# Patient Record
Sex: MALE | Race: WHITE
[De-identification: names, ages, dates, MRNs, and addresses within clinical notes are randomized per-mention and may not be internally consistent; named-entity substitution may affect disease eponyms.]

---

## 2018-11-04 ENCOUNTER — HOSPITAL ENCOUNTER (EMERGENCY)
Dept: HOSPITAL 62 - ER | Age: 58
LOS: 1 days | Discharge: HOME | End: 2018-11-05
Payer: SELF-PAY

## 2018-11-04 DIAGNOSIS — Z88.0: ICD-10-CM

## 2018-11-04 DIAGNOSIS — M54.5: ICD-10-CM

## 2018-11-04 DIAGNOSIS — R03.0: ICD-10-CM

## 2018-11-04 DIAGNOSIS — I25.10: ICD-10-CM

## 2018-11-04 DIAGNOSIS — R10.9: Primary | ICD-10-CM

## 2018-11-04 DIAGNOSIS — E78.2: ICD-10-CM

## 2018-11-04 DIAGNOSIS — E03.9: ICD-10-CM

## 2018-11-04 DIAGNOSIS — E78.00: ICD-10-CM

## 2018-11-04 LAB
ADD MANUAL DIFF: NO
ALBUMIN SERPL-MCNC: 4.3 G/DL (ref 3.5–5)
ALP SERPL-CCNC: 65 U/L (ref 38–126)
ALT SERPL-CCNC: 31 U/L (ref 21–72)
ANION GAP SERPL CALC-SCNC: 12 MMOL/L (ref 5–19)
AST SERPL-CCNC: 24 U/L (ref 17–59)
BASOPHILS # BLD AUTO: 0.1 10^3/UL (ref 0–0.2)
BASOPHILS NFR BLD AUTO: 0.9 % (ref 0–2)
BILIRUB DIRECT SERPL-MCNC: 0.2 MG/DL (ref 0–0.4)
BILIRUB SERPL-MCNC: 0.5 MG/DL (ref 0.2–1.3)
BUN SERPL-MCNC: 23 MG/DL (ref 7–20)
CALCIUM: 9.7 MG/DL (ref 8.4–10.2)
CHLORIDE SERPL-SCNC: 105 MMOL/L (ref 98–107)
CO2 SERPL-SCNC: 26 MMOL/L (ref 22–30)
EOSINOPHIL # BLD AUTO: 0.4 10^3/UL (ref 0–0.6)
EOSINOPHIL NFR BLD AUTO: 3.2 % (ref 0–6)
ERYTHROCYTE [DISTWIDTH] IN BLOOD BY AUTOMATED COUNT: 14 % (ref 11.5–14)
GLUCOSE SERPL-MCNC: 124 MG/DL (ref 75–110)
HCT VFR BLD CALC: 39.8 % (ref 37.9–51)
HGB BLD-MCNC: 13.6 G/DL (ref 13.5–17)
LYMPHOCYTES # BLD AUTO: 2.3 10^3/UL (ref 0.5–4.7)
LYMPHOCYTES NFR BLD AUTO: 18.7 % (ref 13–45)
MCH RBC QN AUTO: 31 PG (ref 27–33.4)
MCHC RBC AUTO-ENTMCNC: 34.1 G/DL (ref 32–36)
MCV RBC AUTO: 91 FL (ref 80–97)
MONOCYTES # BLD AUTO: 0.9 10^3/UL (ref 0.1–1.4)
MONOCYTES NFR BLD AUTO: 6.9 % (ref 3–13)
NEUTROPHILS # BLD AUTO: 8.8 10^3/UL (ref 1.7–8.2)
NEUTS SEG NFR BLD AUTO: 70.3 % (ref 42–78)
PLATELET # BLD: 245 10^3/UL (ref 150–450)
POTASSIUM SERPL-SCNC: 4.6 MMOL/L (ref 3.6–5)
PROT SERPL-MCNC: 8 G/DL (ref 6.3–8.2)
RBC # BLD AUTO: 4.38 10^6/UL (ref 4.35–5.55)
SODIUM SERPL-SCNC: 143.3 MMOL/L (ref 137–145)
TOTAL CELLS COUNTED % (AUTO): 100 %
WBC # BLD AUTO: 12.5 10^3/UL (ref 4–10.5)

## 2018-11-04 PROCEDURE — 93010 ELECTROCARDIOGRAM REPORT: CPT

## 2018-11-04 PROCEDURE — 96375 TX/PRO/DX INJ NEW DRUG ADDON: CPT

## 2018-11-04 PROCEDURE — 80053 COMPREHEN METABOLIC PANEL: CPT

## 2018-11-04 PROCEDURE — 71046 X-RAY EXAM CHEST 2 VIEWS: CPT

## 2018-11-04 PROCEDURE — 96376 TX/PRO/DX INJ SAME DRUG ADON: CPT

## 2018-11-04 PROCEDURE — 85025 COMPLETE CBC W/AUTO DIFF WBC: CPT

## 2018-11-04 PROCEDURE — 93005 ELECTROCARDIOGRAM TRACING: CPT

## 2018-11-04 PROCEDURE — 74176 CT ABD & PELVIS W/O CONTRAST: CPT

## 2018-11-04 PROCEDURE — 96374 THER/PROPH/DIAG INJ IV PUSH: CPT

## 2018-11-04 PROCEDURE — 36415 COLL VENOUS BLD VENIPUNCTURE: CPT

## 2018-11-04 PROCEDURE — 96361 HYDRATE IV INFUSION ADD-ON: CPT

## 2018-11-04 PROCEDURE — 99285 EMERGENCY DEPT VISIT HI MDM: CPT

## 2018-11-04 PROCEDURE — 81001 URINALYSIS AUTO W/SCOPE: CPT

## 2018-11-04 NOTE — ER DOCUMENT REPORT
ED General





- General


Chief Complaint: Flank Pain


Stated Complaint: FLANK PAIN


Time Seen by Provider: 11/04/18 22:06


Mode of Arrival: Ambulatory


Information source: Patient, Relative, Critical access hospital Records


Notes: 





58-year-old male with hyperlipidemia, coronary artery disease, hypothyroidism, 

hypertension (not currently on medication) presents with complaints of left 

flank pain that started a few hours prior to arrival while at rest.  Patient 

describes the pain as stabbing, intermittent and has progressively worsened.  

Pain is worse with deep breathing and moving.  Patient denies any injury, heavy 

lifting, history of kidney stones.  He denies recent illness, nausea, vomiting, 

chest pain, shortness of breath, abdominal pain, dysuria, hematuria.


TRAVEL OUTSIDE OF THE [place] IN LAST 30 DAYS: No





- HPI


Onset: Just prior to arrival


Onset/Duration: Gradual, Persistent, Worse


Quality of pain: Stabbing


Severity: Moderate


Associated symptoms: denies: Chest pain, Nonproductive cough, Diarrhea, Fever, 

Headache, Nausea, Vomiting, Shortness of breath, Sore throat


Exacerbated by: Movement, Deep breathing


Relieved by: Denies


Similar symptoms previously: No


Recently seen / treated by doctor: No





- Related Data


Allergies/Adverse Reactions: 


 





ampicillin [Ampicillin] Allergy (Unknown, Verified 09/15/13 22:17)


 


iodine [Iodine] Allergy (Unknown, Verified 11/04/18 23:16)


 Angioneurotic Edema


Penicillins Allergy (Unknown, Verified 09/15/13 22:17)


 











Past Medical History





- General


Information source: Patient, Critical access hospital Records





- Social History


Smoking Status: Former Smoker


Frequency of alcohol use: Rare


Drug Abuse: None


Lives with: Spouse/Significant other


Family History: CAD - Dad with an MI in his 50s.


Patient has suicidal ideation: No


Patient has homicidal ideation: No





- Past Medical History


Cardiac Medical History: Reports: Hx Hypercholesterolemia, Hx Hypertension


Endocrine Medical History: Reports: Hx Hypothyroidism


Renal/ Medical History: Denies: Hx Peritoneal Dialysis


Past Surgical History: Reports: Hx Cardiac Catheterization, Hx Thyroid Surgery





- Immunizations


Hx Diphtheria, Pertussis, Tetanus Vaccination: No





Review of Systems





- Review of Systems


Notes: 





PHYSICAL EXAMINATION:





GENERAL: Well-appearing, well-nourished and in no acute distress.





HEAD: Atraumatic, normocephalic.





EYES: Pupils equal round and reactive to light, extraocular movements intact, 

sclera anicteric, conjunctiva are normal.





ENT: Nares patent, oropharynx clear without exudates.  Moist mucous membranes.





NECK: Normal range of motion, supple without lymphadenopathy





LUNGS: Breath sounds clear to auscultation bilaterally and equal.  No wheezes 

rales or rhonchi.





HEART: Regular rate and rhythm without murmurs





ABDOMEN: Soft, nontender, nondistended abdomen.  No guarding, no rebound.  No 

masses appreciated.  No CVA tenderness





Musculoskeletal: Normal range of motion, no pitting or edema.  No cyanosis.  No 

midline tenderness of the lumbar spine.  Left low back pain not reproducible.





NEUROLOGICAL: Cranial nerves grossly intact.  Normal speech, normal gait.  

Normal sensory, motor exams 





PSYCH: Normal mood, normal affect.





SKIN: Warm, Dry, normal turgor, no rashes or lesions noted.





Physical Exam





- Vital signs


Vitals: 


 











Temp Pulse Resp BP Pulse Ox


 


 98.3 F   92   18   155/79 H  95 


 


 11/04/18 21:49  11/04/18 21:49  11/04/18 21:49  11/04/18 21:49  11/04/18 21:49














Course





- Re-evaluation


Re-evalutation: 








 





Chest X-Ray  11/04/18 22:13


IMPRESSION:


 


No acute disease.


 








Abdomen/Pelvis CT  11/04/18 22:15


IMPRESSION: 


1. No acute inflammatory or obstructive process identified.


 


This exam was performed according to our departmental


dose-optimization program, which includes automated exposure


control, adjustment of the mA and/or kV according to patient size


and/or use of iterative reconstruction technique.


 














Laboratory











  11/04/18 11/04/18 11/05/18





  22:52 22:52 00:45


 


WBC  12.5 H  


 


RBC  4.38  


 


Hgb  13.6  


 


Hct  39.8  


 


MCV  91  


 


MCH  31.0  


 


MCHC  34.1  


 


RDW  14.0  


 


Plt Count  245  


 


Seg Neutrophils %  70.3  


 


Lymphocytes %  18.7  


 


Monocytes %  6.9  


 


Eosinophils %  3.2  


 


Basophils %  0.9  


 


Absolute Neutrophils  8.8 H  


 


Absolute Lymphocytes  2.3  


 


Absolute Monocytes  0.9  


 


Absolute Eosinophils  0.4  


 


Absolute Basophils  0.1  


 


Sodium   143.3 


 


Potassium   4.6 


 


Chloride   105 


 


Carbon Dioxide   26 


 


Anion Gap   12 


 


BUN   23 H 


 


Creatinine   0.91 


 


Est GFR ( Amer)   > 60 


 


Est GFR (Non-Af Amer)   > 60 


 


Glucose   124 H 


 


Calcium   9.7 


 


Total Bilirubin   0.5 


 


Direct Bilirubin   0.2 


 


Neonat Total Bilirubin   Not Reportable 


 


Neonat Direct Bilirubin   Not Reportable 


 


Neonat Indirect Bili   Not Reportable 


 


AST   24 


 


ALT   31 


 


Alkaline Phosphatase   65 


 


Total Protein   8.0 


 


Albumin   4.3 


 


Urine Color    YELLOW


 


Urine Appearance    CLEAR


 


Urine pH    5.0


 


Ur Specific Gravity    1.023


 


Urine Protein    NEGATIVE


 


Urine Glucose (UA)    NEGATIVE


 


Urine Ketones    NEGATIVE


 


Urine Blood    NEGATIVE


 


Urine Nitrite    NEGATIVE


 


Urine Bilirubin    NEGATIVE


 


Urine Urobilinogen    NEGATIVE


 


Ur Leukocyte Esterase    NEGATIVE


 


Urine WBC (Auto)    1


 


Urine RBC (Auto)    0


 


Squamous Epi Cells Auto    <1


 


Urine Mucus (Auto)    RARE


 


Urine Ascorbic Acid    NEGATIVE











11/04/18 22:21


58-year-old male presents with complaint of left flank pain that started 3 

hours prior to arrival.  Upon arrival vitals were reviewed and patient is 

mildly hypertensive at 155/79 but afebrile, and not hypoxic.  Patient does not 

appear toxic or dehydrated.  He is in no acute distress.  Patient's left flank 

pain is not reproducible on exam.  He otherwise has an unremarkable exam 

without abdominal pain or pulsatile mass.  He denies any history of kidney 

stones.  Patient was given IV fluids, morphine, Zofran.  CT of the abdomen and 

pelvis are pending.  CT was obtained and unremarkable.  CBC shows mild 

leukocytosis, no anemia.  CMP unremarkable.  UA not consistent with urinary 

tract infection.  Patient's pain is likely musculoskeletal.  It is reproducible 

with bending and twisting motion.  Patient advised to ice the area, use Tylenol 

every 4 hours as needed.


11/05/18 00:06


Patient reevaluated states pain has improved but is still present.


11/05/18 01:45


Patient reevaluated again and reports resolution of his pain.  I have low 

suspicion for AAA, urolithiasis, pneumonia, ACS.  Patient was evaluated and 

treated as appropriate for the patient's presenting symptoms and complaint, 

with consideration of any critical or life threatening conditions that may be 

associated with their obtained history and exam as noted above. All results 

were discussed with patient.  Patient provided the opportunity to ask questions

, and express concerns. Patient was educated on treatments based on their 

presumed diagnosis as noted above.  At this time we will discharge the patient 

with return precautions and follow-up recommendations.  Verbal discharge 

instructions given a the bedside. Medication warnings reviewed. Patient is in 

agreement with this plan and has verbalized understanding of return 

precautions. 





After careful consideration I feel that that patient can be safely discharged 

from the emergency department,  they were advised to followup with a primary 

care physician in 2-3 days. 








Dictation on this chart was performed using voice recognition software and may 

result in unintended grammatical, spelling, syntax or errors.

















- Vital Signs


Vital signs: 


 











Temp Pulse Resp BP Pulse Ox


 


 98.9 F   87   19   147/82 H  96 


 


 11/04/18 23:11  11/04/18 23:11  11/04/18 23:11  11/04/18 23:11  11/04/18 23:11














- Laboratory


Result Diagrams: 


 11/04/18 22:52





 11/04/18 22:52


Laboratory results interpreted by me: 


 











  11/04/18 11/04/18





  22:52 22:52


 


WBC  12.5 H 


 


Absolute Neutrophils  8.8 H 


 


BUN   23 H


 


Glucose   124 H














- Diagnostic Test


Radiology reviewed: Image reviewed, Reports reviewed





- EKG Interpretation by Me


EKG shows normal: Sinus rhythm


Rate: Normal


Rhythm: NSR





Discharge





- Discharge


Clinical Impression: 


 Flank pain, Elevated blood pressure reading





Low back pain


Qualifiers:


 Chronicity: acute Back pain laterality: left Sciatica presence: without 

sciatica Qualified Code(s): M54.5 - Low back pain





Condition: Good


Disposition: HOME, SELF-CARE


Instructions:  Antinausea Medication ([facility]), Flank Pain ([facility]), Low Back Pain ([facility]

), Pain Medication Injection ([facility]), Toradol Injection ([facility])


Additional Instructions: 


You have been seen in the Emergency Department (ED)  today for back pain.  Your 

workup and exam have not shown any acute abnormalities and you are likely 

suffering from muscle strain or possible problems with your discs, but there is 

no treatment that will fix your symptoms at this time.  Please take the 

naproxen that has been prescribed as directed. You should also purchase a local 

lidocaine cream such as "aspercreme with lidocaine" and use per bottle 

instructions to the affected area. Apply heat to the area as often as you are 

able. Continue to keep active and avoid prolonged periods of bed rest.





Please follow up with your doctor as soon as possible regarding today's ED 

visit and your back pain.  Return to the ED for worsening back pain, fever, 

weakness or numbness of either leg, or if you develop either (1) an inability 

to urinate or have bowel movements, or (2) loss of your ability to control your 

bathroom functions (if you start having "accidents"), or if you develop other 

new symptoms that concern you.concern you.


Prescriptions: 


Hydrocodone/Acetaminophen [Norco 5-325 mg Tablet] 1 tab PO Q6H #12 tablet


Forms:  Elevated Blood Pressure


Referrals: 


LILY ARMANDO MD [[facility]] - Follow up as needed

## 2018-11-04 NOTE — RADIOLOGY REPORT (SQ)
EXAM DESCRIPTION: 



XR CHEST 2 VIEWS



COMPLETED DATE/TME:  11/04/2018 22:13



CLINICAL HISTORY: 



58 years, Male, flank pain



Findings: The heart is mildly enlarged. Lungs are clear. No

pleural effusion. No pulmonary edema. No pneumothorax.



IMPRESSION:



No acute disease.

## 2018-11-05 VITALS — DIASTOLIC BLOOD PRESSURE: 72 MMHG | SYSTOLIC BLOOD PRESSURE: 133 MMHG

## 2018-11-05 LAB
APPEARANCE UR: CLEAR
APTT PPP: YELLOW S
BILIRUB UR QL STRIP: NEGATIVE
GLUCOSE UR STRIP-MCNC: NEGATIVE MG/DL
KETONES UR STRIP-MCNC: NEGATIVE MG/DL
NITRITE UR QL STRIP: NEGATIVE
PH UR STRIP: 5 [PH] (ref 5–9)
PROT UR STRIP-MCNC: NEGATIVE MG/DL
SP GR UR STRIP: 1.02
UROBILINOGEN UR-MCNC: NEGATIVE MG/DL (ref ?–2)

## 2018-11-05 NOTE — RADIOLOGY REPORT (SQ)
EXAM DESCRIPTION: 



CT ABDOMEN PELVIS WITHOUT IV CONTRAST



COMPLETED DATE/TME:  11/04/2018 22:15



CLINICAL HISTORY: left flank pain



COMPARISON: None Available.



TECHNIQUE: CT of the abdomen and pelvis without IV contrast.

Evaluation of the solid organs and vasculature is suboptimal due

to lack of IV contrast.



DLP: 938.56 mGy-cm



FINDINGS: 



Lung Bases: Minimal bibasilar atelectasis.



Bones: No destructive bone lesions identified. Degenerative

change of the spine.



Abdomen:



Liver: The liver has normal size and decreased density. 

Gallbladder: No calcified gallstones.

Spleen, Pancreas, and Adrenal Glands:  The spleen, pancreas, and

adrenal glands are unremarkable.

Kidneys: The kidneys have normal size and contour without

evidence of hydronephrosis. No obstructing ureteral calculi. Mild

nonspecific bilateral perinephric fat stranding.

Vasculature: Aortoiliac atherosclerosis. The IVC is unremarkable.

 

Stomach:  The stomach and duodenum have normal course. 

Other:  No free intraperitoneal air.  No free fluid or

lymphadenopathy. 



Pelvis:



Bladder:  Urinary bladder is unremarkable. 

Bowel:  No dilated loops of large or small bowel.

Appendix:  Normal appendix.

Pelvis: Prostate is not enlarged.





IMPRESSION: 

1. No acute inflammatory or obstructive process identified.



This exam was performed according to our departmental

dose-optimization program, which includes automated exposure

control, adjustment of the mA and/or kV according to patient size

and/or use of iterative reconstruction technique.